# Patient Record
Sex: MALE | Race: WHITE | NOT HISPANIC OR LATINO | Employment: FULL TIME | URBAN - METROPOLITAN AREA
[De-identification: names, ages, dates, MRNs, and addresses within clinical notes are randomized per-mention and may not be internally consistent; named-entity substitution may affect disease eponyms.]

---

## 2021-06-10 ENCOUNTER — OFFICE VISIT (OUTPATIENT)
Dept: FAMILY MEDICINE CLINIC | Facility: CLINIC | Age: 46
End: 2021-06-10

## 2021-06-10 VITALS
OXYGEN SATURATION: 97 % | HEART RATE: 92 BPM | SYSTOLIC BLOOD PRESSURE: 118 MMHG | TEMPERATURE: 97.9 F | RESPIRATION RATE: 16 BRPM | BODY MASS INDEX: 21.92 KG/M2 | WEIGHT: 148 LBS | DIASTOLIC BLOOD PRESSURE: 82 MMHG | HEIGHT: 69 IN

## 2021-06-10 DIAGNOSIS — L29.9 ITCHING: Primary | ICD-10-CM

## 2021-06-10 PROCEDURE — 99213 OFFICE O/P EST LOW 20 MIN: CPT | Performed by: FAMILY MEDICINE

## 2021-06-10 RX ORDER — CETIRIZINE HYDROCHLORIDE 10 MG/1
10 TABLET, CHEWABLE ORAL DAILY
Qty: 30 TABLET | Refills: 0 | Status: SHIPPED | OUTPATIENT
Start: 2021-06-10

## 2021-06-10 RX ORDER — FAMOTIDINE 20 MG/1
20 TABLET, FILM COATED ORAL 2 TIMES DAILY
Qty: 30 TABLET | Refills: 0 | Status: SHIPPED | OUTPATIENT
Start: 2021-06-10

## 2021-06-10 NOTE — ASSESSMENT & PLAN NOTE
Unclear etiology at this point  Recommended diary to find triggers  Will prescribe Zyrtec to help with itching and may use Pepcid  In addition to  Zyrtec if Zyrtec monotherapy not helping  Consider hepatic cause given history of alcohol use  Will order CMP CBC lipid panel and check for HIV and hep C given limited information on patient  Follow-up in 3 weeks

## 2021-06-10 NOTE — PROGRESS NOTES
Assessment/Plan:    Itching    Unclear etiology at this point  Recommended diary to find triggers  Will prescribe Zyrtec to help with itching and may use Pepcid  In addition to  Zyrtec if Zyrtec monotherapy not helping  Consider hepatic cause given history of alcohol use  Will order CMP CBC lipid panel and check for HIV and hep C given limited information on patient  Follow-up in 3 weeks  Problem List Items Addressed This Visit        Other    Itching - Primary       Unclear etiology at this point  Recommended diary to find triggers  Will prescribe Zyrtec to help with itching and may use Pepcid  In addition to  Zyrtec if Zyrtec monotherapy not helping  Consider hepatic cause given history of alcohol use  Will order CMP CBC lipid panel and check for HIV and hep C given limited information on patient  Follow-up in 3 weeks  Relevant Medications    famotidine (PEPCID) 20 mg tablet    cetirizine (ZyrTEC) 10 MG chewable tablet    Other Relevant Orders    HIV 1/2 Antigen/Antibody (4th Generation) w Reflex [facility]    Comprehensive metabolic panel    Hepatitis C antibody    Lipid panel    CBC and differential            Subjective:      Patient ID: Honey Jaime is a 55 y o  male  Presents to the clinic to establish care and also for itching  Patient moved to the area from [address] [place] [address] and says he has had total body itching for the last 2 months  He denies any changes in detergent or soap  He works in a warehouse  And denies any a viral to exposure to triggers  He has never seen a rash  And says his whole body itches  Denies any significant medical history  Not currently on any medications  He does drink on the weekends sometimes up to 12 beers  He does not take excessive long showers with very hot water  Rash  Pertinent negatives include no cough, diarrhea, fever, shortness of breath or vomiting         The following portions of the patient's history were reviewed and updated as appropriate: allergies, current medications, past family history, past medical history, past social history, past surgical history and problem list     Review of Systems   Constitutional: Negative for chills and fever  Respiratory: Negative for cough and shortness of breath  Cardiovascular: Negative for chest pain and palpitations  Gastrointestinal: Negative for abdominal distention, abdominal pain, blood in stool, constipation, diarrhea, nausea and vomiting  Skin: Negative for color change, pallor, rash and wound  Hematological: Negative for adenopathy  Does not bruise/bleed easily  All other systems reviewed and are negative  Objective:      /82 (BP Location: Left arm, Patient Position: Sitting, Cuff Size: Standard)   Pulse 92   Temp 97 9 °F (36 6 °C) (Temporal)   Resp 16   Ht 5' 9" (1 753 m)   Wt 67 1 kg (148 lb)   SpO2 97%   BMI 21 86 kg/m²          Physical Exam  Vitals signs and nursing note reviewed  Constitutional:       General: He is not in acute distress  Appearance: Normal appearance  He is not ill-appearing, toxic-appearing or diaphoretic  HENT:      Head: Normocephalic and atraumatic  Right Ear: External ear normal       Nose: No congestion or rhinorrhea  Mouth/Throat:      Mouth: Mucous membranes are moist       Pharynx: Oropharynx is clear  No oropharyngeal exudate or posterior oropharyngeal erythema  Eyes:      General:         Right eye: No discharge  Left eye: No discharge  Extraocular Movements: Extraocular movements intact  Conjunctiva/sclera: Conjunctivae normal       Pupils: Pupils are equal, round, and reactive to light  Neck:      Musculoskeletal: Normal range of motion and neck supple  Cardiovascular:      Rate and Rhythm: Normal rate and regular rhythm  Pulses: Normal pulses  Heart sounds: Normal heart sounds  No murmur  Pulmonary:      Effort: Pulmonary effort is normal  No respiratory distress  Abdominal:      General: Abdomen is flat  Bowel sounds are normal  There is no distension  Palpations: There is no mass  Tenderness: There is no abdominal tenderness  Musculoskeletal: Normal range of motion  Skin:     General: Skin is warm and dry  Capillary Refill: Capillary refill takes less than 2 seconds  Coloration: Skin is not pale  Findings: No erythema or rash  Neurological:      General: No focal deficit present  Mental Status: He is alert and oriented to person, place, and time  Cranial Nerves: No cranial nerve deficit     Psychiatric:         Mood and Affect: Mood normal          Behavior: Behavior normal

## 2021-07-15 ENCOUNTER — HOSPITAL ENCOUNTER (EMERGENCY)
Facility: HOSPITAL | Age: 46
Discharge: HOME | End: 2021-07-15
Attending: EMERGENCY MEDICINE
Payer: COMMERCIAL

## 2021-07-15 VITALS
SYSTOLIC BLOOD PRESSURE: 142 MMHG | DIASTOLIC BLOOD PRESSURE: 95 MMHG | TEMPERATURE: 98.2 F | OXYGEN SATURATION: 95 % | RESPIRATION RATE: 18 BRPM | HEART RATE: 97 BPM | WEIGHT: 165 LBS | BODY MASS INDEX: 24.37 KG/M2

## 2021-07-15 DIAGNOSIS — N48.89 PENILE IRRITATION: Primary | ICD-10-CM

## 2021-07-15 PROCEDURE — 99284 EMERGENCY DEPT VISIT MOD MDM: CPT | Performed by: EMERGENCY MEDICINE

## 2021-07-15 PROCEDURE — 99283 EMERGENCY DEPT VISIT LOW MDM: CPT

## 2021-07-15 RX ORDER — FLUCONAZOLE 200 MG/1
200 TABLET ORAL ONCE
Status: COMPLETED | OUTPATIENT
Start: 2021-07-15 | End: 2021-07-15

## 2021-07-15 RX ADMIN — FLUCONAZOLE 200 MG: 200 TABLET ORAL at 15:30

## 2021-07-15 NOTE — ED ATTENDING ATTESTATION
7/15/2021  Collette Donaldson DO, saw and evaluated the patient  I have discussed the patient with the resident/non-physician practitioner and agree with the resident's/non-physician practitioner's findings, Plan of Care, and MDM as documented in the resident's/non-physician practitioner's note, except where noted  All available labs and Radiology studies were reviewed  I was present for key portions of any procedure(s) performed by the resident/non-physician practitioner and I was immediately available to provide assistance  At this point I agree with the current assessment done in the Emergency Department  I have conducted an independent evaluation of this patient a history and physical is as follows:    51-year-old male presents for penile itching after having a lot of sex  Ongoing for 1 day  Patient's partner also has vaginal itching, for 1 week  Denies penile discharge, dysuria  Patient refuses to be examined by myself  According to Dr Tiffanie López patient does not have any findings of balanitis on exam   However he does complain of itching and his partner likely has a yeast infection  Will treat with single dose of Diflucan        ED Course         Critical Care Time  Procedures
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
## 2021-07-15 NOTE — ED NOTES
Heber from Pharmacy called to check for the status of the Diflucan tablet 200mg  Per [facility] he will check on the medication and send it down        Ruslan Mariscal RN  07/15/21 2418

## 2022-10-11 ENCOUNTER — VBI (OUTPATIENT)
Dept: ADMINISTRATIVE | Facility: OTHER | Age: 47
End: 2022-10-11

## 2023-03-09 ENCOUNTER — VBI (OUTPATIENT)
Dept: ADMINISTRATIVE | Facility: OTHER | Age: 48
End: 2023-03-09